# Patient Record
Sex: MALE | Race: WHITE | NOT HISPANIC OR LATINO
[De-identification: names, ages, dates, MRNs, and addresses within clinical notes are randomized per-mention and may not be internally consistent; named-entity substitution may affect disease eponyms.]

---

## 2020-02-15 ENCOUNTER — TRANSCRIPTION ENCOUNTER (OUTPATIENT)
Age: 35
End: 2020-02-15

## 2020-08-25 PROBLEM — Z00.00 ENCOUNTER FOR PREVENTIVE HEALTH EXAMINATION: Status: ACTIVE | Noted: 2020-08-25

## 2020-08-26 ENCOUNTER — APPOINTMENT (OUTPATIENT)
Dept: UROLOGY | Facility: CLINIC | Age: 35
End: 2020-08-26

## 2020-09-08 ENCOUNTER — TRANSCRIPTION ENCOUNTER (OUTPATIENT)
Age: 35
End: 2020-09-08

## 2020-09-09 ENCOUNTER — TRANSCRIPTION ENCOUNTER (OUTPATIENT)
Age: 35
End: 2020-09-09

## 2020-09-24 ENCOUNTER — APPOINTMENT (OUTPATIENT)
Dept: UROLOGY | Facility: CLINIC | Age: 35
End: 2020-09-24
Payer: COMMERCIAL

## 2020-09-24 VITALS
RESPIRATION RATE: 14 BRPM | TEMPERATURE: 98 F | BODY MASS INDEX: 30.06 KG/M2 | WEIGHT: 210 LBS | HEIGHT: 70 IN | DIASTOLIC BLOOD PRESSURE: 84 MMHG | OXYGEN SATURATION: 96 % | SYSTOLIC BLOOD PRESSURE: 142 MMHG | HEART RATE: 62 BPM

## 2020-09-24 DIAGNOSIS — N39.43 POST-VOID DRIBBLING: ICD-10-CM

## 2020-09-24 DIAGNOSIS — Z83.3 FAMILY HISTORY OF DIABETES MELLITUS: ICD-10-CM

## 2020-09-24 DIAGNOSIS — Z78.9 OTHER SPECIFIED HEALTH STATUS: ICD-10-CM

## 2020-09-24 DIAGNOSIS — Z82.49 FAMILY HISTORY OF ISCHEMIC HEART DISEASE AND OTHER DISEASES OF THE CIRCULATORY SYSTEM: ICD-10-CM

## 2020-09-24 DIAGNOSIS — Z86.39 PERSONAL HISTORY OF OTHER ENDOCRINE, NUTRITIONAL AND METABOLIC DISEASE: ICD-10-CM

## 2020-09-24 PROCEDURE — 99203 OFFICE O/P NEW LOW 30 MIN: CPT

## 2020-09-24 NOTE — HISTORY OF PRESENT ILLNESS
[FreeTextEntry1] : He is a 35-year-old man who is seen today for initial visit. In the last month he has had bilateral testicular pain radiating into the left groin. There is no change in urinary symptoms. He does not have any fever. There is no history of trauma. His primary care physician gave him doxycycline but symptoms did not change. A similar episode happened 5 years ago and he responded to antibiotics. Radiological studies showed normal scrotal ultrasound in September 2020 and also in 2014. CT scan without contrast in 2013 was normal. He has left-sided varicoceles. He complains of post void dribbling.

## 2020-09-24 NOTE — PHYSICAL EXAM
[General Appearance - Well Developed] : well developed [General Appearance - Well Nourished] : well nourished [Normal Appearance] : normal appearance [Well Groomed] : well groomed [General Appearance - In No Acute Distress] : no acute distress [Edema] : no peripheral edema [Respiration, Rhythm And Depth] : normal respiratory rhythm and effort [Exaggerated Use Of Accessory Muscles For Inspiration] : no accessory muscle use [Abdomen Soft] : soft [Abdomen Tenderness] : non-tender [Costovertebral Angle Tenderness] : no ~M costovertebral angle tenderness [Urethral Meatus] : meatus normal [Penis Abnormality] : normal uncircumcised penis [Urinary Bladder Findings] : the bladder was normal on palpation [Scrotum] : the scrotum was normal [Testes Mass (___cm)] : there were no testicular masses [FreeTextEntry1] : Left grade 2 varicoceles. Left epididymis tender [Normal Station and Gait] : the gait and station were normal for the patient's age [] : no rash [No Focal Deficits] : no focal deficits [Oriented To Time, Place, And Person] : oriented to person, place, and time [Affect] : the affect was normal [Mood] : the mood was normal [Not Anxious] : not anxious [Inguinal Lymph Nodes Enlarged Bilaterally] : inguinal

## 2020-09-24 NOTE — ASSESSMENT
[FreeTextEntry1] : He has tenderness of the left epididymis. Also varicoceles may be contributing to his pain as well. Suggest scrotal elevation, ice packs, no heavy lifting, and also dietary modifications were discussed. If symptoms do not improve he will start anti-inflammatory agents. Subsequently we can try other medications such as alpha blocker therapy, gabapentin, etc or consider cord injection. He will start with conservative management and anti-inflammatory agents for now. Also we discussed maneuvers which could decrease postvoid dribbling.

## 2020-09-25 LAB
APPEARANCE: CLEAR
BACTERIA: NEGATIVE
BILIRUBIN URINE: NEGATIVE
BLOOD URINE: NEGATIVE
COLOR: YELLOW
GLUCOSE QUALITATIVE U: NEGATIVE
HYALINE CASTS: 0 /LPF
KETONES URINE: NEGATIVE
LEUKOCYTE ESTERASE URINE: NEGATIVE
MICROSCOPIC-UA: NORMAL
NITRITE URINE: NEGATIVE
PH URINE: 7
PROTEIN URINE: ABNORMAL
RED BLOOD CELLS URINE: 1 /HPF
SPECIFIC GRAVITY URINE: 1.03
SQUAMOUS EPITHELIAL CELLS: 0 /HPF
UROBILINOGEN URINE: ABNORMAL
WHITE BLOOD CELLS URINE: 1 /HPF

## 2020-09-27 LAB — BACTERIA UR CULT: NORMAL

## 2021-03-24 ENCOUNTER — TRANSCRIPTION ENCOUNTER (OUTPATIENT)
Age: 36
End: 2021-03-24

## 2021-04-01 ENCOUNTER — TRANSCRIPTION ENCOUNTER (OUTPATIENT)
Age: 36
End: 2021-04-01

## 2021-07-14 ENCOUNTER — APPOINTMENT (OUTPATIENT)
Dept: UROLOGY | Facility: CLINIC | Age: 36
End: 2021-07-14
Payer: COMMERCIAL

## 2021-07-14 VITALS
HEIGHT: 70 IN | DIASTOLIC BLOOD PRESSURE: 90 MMHG | RESPIRATION RATE: 15 BRPM | TEMPERATURE: 97.7 F | WEIGHT: 210 LBS | BODY MASS INDEX: 30.06 KG/M2 | SYSTOLIC BLOOD PRESSURE: 135 MMHG | OXYGEN SATURATION: 96 % | HEART RATE: 65 BPM

## 2021-07-14 DIAGNOSIS — N45.2 ORCHITIS: ICD-10-CM

## 2021-07-14 DIAGNOSIS — I86.1 SCROTAL VARICES: ICD-10-CM

## 2021-07-14 PROCEDURE — 99213 OFFICE O/P EST LOW 20 MIN: CPT

## 2021-07-14 PROCEDURE — 99072 ADDL SUPL MATRL&STAF TM PHE: CPT

## 2021-07-14 NOTE — ASSESSMENT
[FreeTextEntry1] : Urinalysis and urine culture will be sent.  He has no GI related symptoms and he has been evaluated by gastroenterology in the past, according to the patient.  He will try to stay more hydrated which seems to help with his symptoms.  He would rather try another course of antibiotic therapy rather than other treatment such as alpha-blocker therapy or anti-inflammatory agents.  He can try Bactrim for 5 days to see if symptoms improve.  Lifestyle and dietary modifications were discussed.

## 2021-07-14 NOTE — PHYSICAL EXAM
[General Appearance - Well Developed] : well developed [General Appearance - Well Nourished] : well nourished [Normal Appearance] : normal appearance [Well Groomed] : well groomed [General Appearance - In No Acute Distress] : no acute distress [Abdomen Soft] : soft [Abdomen Tenderness] : non-tender [Costovertebral Angle Tenderness] : no ~M costovertebral angle tenderness [Urethral Meatus] : meatus normal [Penis Abnormality] : normal uncircumcised penis [Urinary Bladder Findings] : the bladder was normal on palpation [Scrotum] : the scrotum was normal [Testes Tenderness] : no tenderness of the testes [Testes Mass (___cm)] : there were no testicular masses [FreeTextEntry1] : Left grade 2 varicoceles unchanged, no testicular pain. [] : no respiratory distress [Respiration, Rhythm And Depth] : normal respiratory rhythm and effort [Exaggerated Use Of Accessory Muscles For Inspiration] : no accessory muscle use [Oriented To Time, Place, And Person] : oriented to person, place, and time [Affect] : the affect was normal [Mood] : the mood was normal [Not Anxious] : not anxious

## 2021-07-14 NOTE — REVIEW OF SYSTEMS
[Abdominal Pain] : abdominal pain [see HPI] : see HPI [Nocturia] : nocturia [Negative] : Respiratory

## 2021-07-14 NOTE — HISTORY OF PRESENT ILLNESS
[FreeTextEntry1] : He is a 36 year-old man who is seen today in follow-up.  He was last seen in September 2020.  In the last few weeks he has burning at the base of the penis, discomfort in the left groin and also has urinary frequency and urgency with nocturia up to 5 times.  He only urinates small amounts at nights.  Residual urine today was minimal.  There is no hematuria or flank pain.  There is no fever.  In September 2020, urinalysis and urine culture were negative.  For similar episodes in the past, he has responded to antibiotic therapy.  There is no change in his bowel symptoms.\par \par Previous notes: Previously, he had bilateral testicular pain radiating into the left groin. His primary care physician gave him doxycycline but symptoms did not change. A similar episode happened 5 years ago and he responded to antibiotics. Radiological studies showed normal scrotal ultrasound in September 2020 and also in 2014. CT scan without contrast in 2013 was normal. He has left-sided varicoceles. He complains of post void dribbling.

## 2021-07-15 LAB
APPEARANCE: CLEAR
BACTERIA: NEGATIVE
BILIRUBIN URINE: NEGATIVE
BLOOD URINE: NEGATIVE
COLOR: COLORLESS
GLUCOSE QUALITATIVE U: NEGATIVE
HYALINE CASTS: 0 /LPF
KETONES URINE: NEGATIVE
LEUKOCYTE ESTERASE URINE: NEGATIVE
MICROSCOPIC-UA: NORMAL
NITRITE URINE: NEGATIVE
PH URINE: 6.5
PROTEIN URINE: NEGATIVE
RED BLOOD CELLS URINE: 1 /HPF
SPECIFIC GRAVITY URINE: 1.01
SQUAMOUS EPITHELIAL CELLS: 0 /HPF
UROBILINOGEN URINE: NORMAL
WHITE BLOOD CELLS URINE: 0 /HPF

## 2021-07-16 LAB — BACTERIA UR CULT: NORMAL

## 2022-03-22 ENCOUNTER — TRANSCRIPTION ENCOUNTER (OUTPATIENT)
Age: 37
End: 2022-03-22

## 2022-05-12 ENCOUNTER — APPOINTMENT (OUTPATIENT)
Dept: NEUROLOGY | Facility: CLINIC | Age: 37
End: 2022-05-12
Payer: COMMERCIAL

## 2022-05-12 VITALS
SYSTOLIC BLOOD PRESSURE: 138 MMHG | HEIGHT: 70 IN | DIASTOLIC BLOOD PRESSURE: 90 MMHG | WEIGHT: 230 LBS | BODY MASS INDEX: 32.93 KG/M2 | HEART RATE: 75 BPM

## 2022-05-12 DIAGNOSIS — R42 DIZZINESS AND GIDDINESS: ICD-10-CM

## 2022-05-12 DIAGNOSIS — R51.9 HEADACHE, UNSPECIFIED: ICD-10-CM

## 2022-05-12 PROCEDURE — 99203 OFFICE O/P NEW LOW 30 MIN: CPT

## 2022-05-12 RX ORDER — PREDNISONE 20 MG/1
20 TABLET ORAL
Qty: 18 | Refills: 0 | Status: ACTIVE | COMMUNITY
Start: 2022-05-12 | End: 1900-01-01

## 2022-05-12 RX ORDER — DIAZEPAM 5 MG/1
5 TABLET ORAL
Qty: 2 | Refills: 0 | Status: ACTIVE | COMMUNITY
Start: 2022-05-12 | End: 1900-01-01

## 2022-05-12 NOTE — HISTORY OF PRESENT ILLNESS
[FreeTextEntry1] : 36 y/o RH man with hx of Hashimoto's thyroiditis p/w vertigo. He two weeks ago had episode of dizziness and room spinning and went to [facility]. A week later he still had symptoms and went to an ENT and had workup which suggested which suggested positional vertigo without hearing loss. He still did not improve and was seen and found to have abnormal eye movements with a vertigo specialist. \par If sitting doing nothing he will feel nauseated, but if he gets up or exerts himself he feels more especially to the right. He gets a pain on the left side he feels pain in the left ear and sensation of fullness and ringing in the right ear. Has headache on the left side most of the time. He took meclizine which took the edge off but was very sedating. He gets headaches trigger by the weather and has had then since he was younger. Feels like a stabbing type of pain and can last for a couple of hours, feels better if lies down to takes Advil. He has no family members with headache but there is hx of Lupus and Rheumatoid arthritis.  He did not have any head imaging when at the hospital or ENT. \par

## 2022-05-12 NOTE — PHYSICAL EXAM
[General Appearance - Alert] : alert [General Appearance - In No Acute Distress] : in no acute distress [Oriented To Time, Place, And Person] : oriented to person, place, and time [Impaired Insight] : insight and judgment were intact [Affect] : the affect was normal [Person] : oriented to person [Place] : oriented to place [Time] : oriented to time [Concentration Intact] : normal concentrating ability [Visual Intact] : visual attention was ~T not ~L decreased [Naming Objects] : no difficulty naming common objects [Repeating Phrases] : no difficulty repeating a phrase [Writing A Sentence] : no difficulty writing a sentence [Fluency] : fluency intact [Comprehension] : comprehension intact [Reading] : reading intact [Past History] : adequate knowledge of personal past history [Cranial Nerves Optic (II)] : visual acuity intact bilaterally,  visual fields full to confrontation, pupils equal round and reactive to light [Cranial Nerves Oculomotor (III)] : extraocular motion intact [Cranial Nerves Trigeminal (V)] : facial sensation intact symmetrically [Cranial Nerves Facial (VII)] : face symmetrical [Cranial Nerves Vestibulocochlear (VIII)] : hearing was intact bilaterally [Cranial Nerves Glossopharyngeal (IX)] : tongue and palate midline [Cranial Nerves Accessory (XI - Cranial And Spinal)] : head turning and shoulder shrug symmetric [Cranial Nerves Hypoglossal (XII)] : there was no tongue deviation with protrusion [Motor Tone] : muscle tone was normal in all four extremities [Motor Strength] : muscle strength was normal in all four extremities [No Muscle Atrophy] : normal bulk in all four extremities [Motor Handedness Right-Handed] : the patient is right hand dominant [Sensation Tactile Decrease] : light touch was intact [Sensation Pain / Temperature Decrease] : pain and temperature was intact [Sensation Vibration Decrease] : vibration was intact [Abnormal Walk] : normal gait [Balance] : balance was intact [2+] : Ankle jerk left 2+ [Sclera] : the sclera and conjunctiva were normal [PERRL With Normal Accommodation] : pupils were equal in size, round, reactive to light, with normal accommodation [Extraocular Movements] : extraocular movements were intact [Full Visual Field] : full visual field [Optic Disc Not Visualized] : not visualized [Outer Ear] : the ears and nose were normal in appearance [Hearing Threshold Finger Rub Not [place]] : hearing was normal [Both Tympanic Membranes Were Examined] : both tympanic membranes were normal [Neck Appearance] : the appearance of the neck was normal [] : no respiratory distress [Respiration, Rhythm And Depth] : normal respiratory rhythm and effort [Past-pointing] : there was no past-pointing [Tremor] : no tremor present [Plantar Reflex Right Only] : normal on the right [Plantar Reflex Left Only] : normal on the left [FreeTextEntry6] : [place] pick positive to the right. Head impulse positive to the right

## 2022-05-12 NOTE — ASSESSMENT
[FreeTextEntry1] : 36 y/o RH man with hx of Hashimoto's p/w two weeks of vertigo, nausea, and left sided earache and headache. He has hx of almost daily headache since he was younger c/w migraine headaches. 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 Possible vestibular neuritis vs vertiginous migraine vs less likely demyelinating lesion . \par \par Prednisone- 60mg for 3 days, 40mg for 3 days, and 20mg for 2 days. \par Vestibular Therapy referral\par MRI brain w/wo\par Valium 5mg x 2 tablets  for MRI claustrophobia

## 2022-05-18 ENCOUNTER — EMERGENCY (EMERGENCY)
Facility: HOSPITAL | Age: 37
LOS: 1 days | Discharge: LEFT BEFORE DISCHARGE | End: 2022-05-18
Attending: EMERGENCY MEDICINE | Admitting: EMERGENCY MEDICINE
Payer: COMMERCIAL

## 2022-05-18 VITALS
TEMPERATURE: 98 F | HEART RATE: 78 BPM | DIASTOLIC BLOOD PRESSURE: 94 MMHG | SYSTOLIC BLOOD PRESSURE: 145 MMHG | RESPIRATION RATE: 16 BRPM | OXYGEN SATURATION: 99 %

## 2022-05-18 LAB
ALBUMIN SERPL ELPH-MCNC: 4.8 G/DL — SIGNIFICANT CHANGE UP (ref 3.3–5)
ALP SERPL-CCNC: 63 U/L — SIGNIFICANT CHANGE UP (ref 40–120)
ALT FLD-CCNC: 26 U/L — SIGNIFICANT CHANGE UP (ref 4–41)
ANION GAP SERPL CALC-SCNC: 13 MMOL/L — SIGNIFICANT CHANGE UP (ref 7–14)
APPEARANCE UR: CLEAR — SIGNIFICANT CHANGE UP
AST SERPL-CCNC: 20 U/L — SIGNIFICANT CHANGE UP (ref 4–40)
BACTERIA # UR AUTO: NEGATIVE — SIGNIFICANT CHANGE UP
BASOPHILS # BLD AUTO: 0.08 K/UL — SIGNIFICANT CHANGE UP (ref 0–0.2)
BASOPHILS NFR BLD AUTO: 0.7 % — SIGNIFICANT CHANGE UP (ref 0–2)
BILIRUB SERPL-MCNC: 0.3 MG/DL — SIGNIFICANT CHANGE UP (ref 0.2–1.2)
BILIRUB UR-MCNC: NEGATIVE — SIGNIFICANT CHANGE UP
BUN SERPL-MCNC: 22 MG/DL — SIGNIFICANT CHANGE UP (ref 7–23)
CALCIUM SERPL-MCNC: 9.6 MG/DL — SIGNIFICANT CHANGE UP (ref 8.4–10.5)
CHLORIDE SERPL-SCNC: 98 MMOL/L — SIGNIFICANT CHANGE UP (ref 98–107)
CO2 SERPL-SCNC: 26 MMOL/L — SIGNIFICANT CHANGE UP (ref 22–31)
COLOR SPEC: SIGNIFICANT CHANGE UP
CREAT SERPL-MCNC: 1.05 MG/DL — SIGNIFICANT CHANGE UP (ref 0.5–1.3)
DIFF PNL FLD: NEGATIVE — SIGNIFICANT CHANGE UP
EGFR: 94 ML/MIN/1.73M2 — SIGNIFICANT CHANGE UP
EOSINOPHIL # BLD AUTO: 0.08 K/UL — SIGNIFICANT CHANGE UP (ref 0–0.5)
EOSINOPHIL NFR BLD AUTO: 0.7 % — SIGNIFICANT CHANGE UP (ref 0–6)
GLUCOSE SERPL-MCNC: 93 MG/DL — SIGNIFICANT CHANGE UP (ref 70–99)
GLUCOSE UR QL: NEGATIVE — SIGNIFICANT CHANGE UP
HCT VFR BLD CALC: 45.6 % — SIGNIFICANT CHANGE UP (ref 39–50)
HGB BLD-MCNC: 15.1 G/DL — SIGNIFICANT CHANGE UP (ref 13–17)
HIV 1+2 AB+HIV1 P24 AG SERPL QL IA: SIGNIFICANT CHANGE UP
IANC: 7.77 K/UL — HIGH (ref 1.8–7.4)
IMM GRANULOCYTES NFR BLD AUTO: 0.7 % — SIGNIFICANT CHANGE UP (ref 0–1.5)
KETONES UR-MCNC: NEGATIVE — SIGNIFICANT CHANGE UP
LEUKOCYTE ESTERASE UR-ACNC: NEGATIVE — SIGNIFICANT CHANGE UP
LYMPHOCYTES # BLD AUTO: 2.75 K/UL — SIGNIFICANT CHANGE UP (ref 1–3.3)
LYMPHOCYTES # BLD AUTO: 23.1 % — SIGNIFICANT CHANGE UP (ref 13–44)
MCHC RBC-ENTMCNC: 30.6 PG — SIGNIFICANT CHANGE UP (ref 27–34)
MCHC RBC-ENTMCNC: 33.1 GM/DL — SIGNIFICANT CHANGE UP (ref 32–36)
MCV RBC AUTO: 92.3 FL — SIGNIFICANT CHANGE UP (ref 80–100)
MONOCYTES # BLD AUTO: 1.12 K/UL — HIGH (ref 0–0.9)
MONOCYTES NFR BLD AUTO: 9.4 % — SIGNIFICANT CHANGE UP (ref 2–14)
NEUTROPHILS # BLD AUTO: 7.77 K/UL — HIGH (ref 1.8–7.4)
NEUTROPHILS NFR BLD AUTO: 65.4 % — SIGNIFICANT CHANGE UP (ref 43–77)
NITRITE UR-MCNC: NEGATIVE — SIGNIFICANT CHANGE UP
NRBC # BLD: 0 /100 WBCS — SIGNIFICANT CHANGE UP
NRBC # FLD: 0 K/UL — SIGNIFICANT CHANGE UP
PH UR: 6.5 — SIGNIFICANT CHANGE UP (ref 5–8)
PLATELET # BLD AUTO: 303 K/UL — SIGNIFICANT CHANGE UP (ref 150–400)
POTASSIUM SERPL-MCNC: 3.9 MMOL/L — SIGNIFICANT CHANGE UP (ref 3.5–5.3)
POTASSIUM SERPL-SCNC: 3.9 MMOL/L — SIGNIFICANT CHANGE UP (ref 3.5–5.3)
PROT SERPL-MCNC: 7.9 G/DL — SIGNIFICANT CHANGE UP (ref 6–8.3)
PROT UR-MCNC: ABNORMAL
RAPID RVP RESULT: SIGNIFICANT CHANGE UP
RBC # BLD: 4.94 M/UL — SIGNIFICANT CHANGE UP (ref 4.2–5.8)
RBC # FLD: 12.6 % — SIGNIFICANT CHANGE UP (ref 10.3–14.5)
RBC CASTS # UR COMP ASSIST: 1 /HPF — SIGNIFICANT CHANGE UP (ref 0–4)
SARS-COV-2 RNA SPEC QL NAA+PROBE: SIGNIFICANT CHANGE UP
SODIUM SERPL-SCNC: 137 MMOL/L — SIGNIFICANT CHANGE UP (ref 135–145)
SP GR SPEC: 1.02 — SIGNIFICANT CHANGE UP (ref 1–1.05)
TSH SERPL-MCNC: 4.91 UIU/ML — HIGH (ref 0.27–4.2)
UROBILINOGEN FLD QL: SIGNIFICANT CHANGE UP
WBC # BLD: 11.88 K/UL — HIGH (ref 3.8–10.5)
WBC # FLD AUTO: 11.88 K/UL — HIGH (ref 3.8–10.5)
WBC UR QL: 1 /HPF — SIGNIFICANT CHANGE UP (ref 0–5)

## 2022-05-18 PROCEDURE — 99243 OFF/OP CNSLTJ NEW/EST LOW 30: CPT

## 2022-05-18 PROCEDURE — 99220: CPT

## 2022-05-18 PROCEDURE — 70450 CT HEAD/BRAIN W/O DYE: CPT | Mod: 26,MA

## 2022-05-18 RX ORDER — ACETAMINOPHEN 500 MG
975 TABLET ORAL ONCE
Refills: 0 | Status: DISCONTINUED | OUTPATIENT
Start: 2022-05-18 | End: 2022-05-22

## 2022-05-18 RX ORDER — DIAZEPAM 5 MG
10 TABLET ORAL ONCE
Refills: 0 | Status: DISCONTINUED | OUTPATIENT
Start: 2022-05-18 | End: 2022-05-19

## 2022-05-18 RX ORDER — DIAZEPAM 5 MG
10 TABLET ORAL ONCE
Refills: 0 | Status: DISCONTINUED | OUTPATIENT
Start: 2022-05-18 | End: 2022-05-18

## 2022-05-18 RX ORDER — LEVOTHYROXINE SODIUM 125 MCG
100 TABLET ORAL DAILY
Refills: 0 | Status: DISCONTINUED | OUTPATIENT
Start: 2022-05-18 | End: 2022-05-22

## 2022-05-18 RX ADMIN — Medication 10 MILLIGRAM(S): at 21:28

## 2022-05-18 NOTE — ED ADULT TRIAGE NOTE - CHIEF COMPLAINT QUOTE
states" I am having dizziness with head ache since 3 weeks and took meclizine and prednisone with no relief" sent in by neurologist . c/o head ache

## 2022-05-18 NOTE — ED ADULT NURSE NOTE - OBJECTIVE STATEMENT
torri rn- received pt in room 11, 37 yr/o male A+ox4, ambulatory at baseline. presented to the ED C/O worsening headache and vertigo over the last 3 weeks, sates he was seen by his PCP and prescribed meclizine and prednisone with no relief of pain. states pain radiates to the left ear, symptoms worsen with movement and laying flat in bed. pt endorses new onset dizziness and feelings of being off balance. VSS, denies chest pain and SOB, RR even and unlabored. pending MD orders. will continue to monitor.

## 2022-05-18 NOTE — ED CDU PROVIDER INITIAL DAY NOTE - OBJECTIVE STATEMENT
Ralph - 38 y/o M c/o nausea and dizziness constantly present x approx 3 weeks.  Dizziness described as spinning sensation.  Worse with movement of head or lying on left side.  Denies blurry vision, extremity weakness or numbness.  + headache.  To Neurologist Dr. Hatfield last thursday, prescribed prednisone and meclizine.  Started prednisone on Saturday.  No improvement in symptoms.  spoke to Neurologist today and referred to ED.  Neuro team already at bedside.  No known head trauma.  No recent fever, cough, covid.    CDU Note: Agree with above, pt is a 38 y/o M hx Hashimotos thyroiditis here w/ vertigo x 3 weeks, sent in by outpatient neuro for MRI head to eval for central causes of vertigo. Pt currently c/o headache, but does not want any pain meds. Able to ambulate. NO other complaints.

## 2022-05-18 NOTE — ED PROVIDER NOTE - OBJECTIVE STATEMENT
Loree - 38 y/o M c/o nausea and dizziness constantly present x approx 3 weeks.  Dizziness described as spinning sensation.  Worse with movement of head or lying on left side.  Denies blurry vision, extremity weakness or numbness.  + headache.  To Neurologist Dr. Hatfield last thursday, prescribed prednisone and meclizine.  Started prednisone on Saturday.  No improvement in symptoms.  spoke to Neurologist today and referred to ED.  Neuro team already at bedside.  No known head trauma.  No recent fever, cough, covid.

## 2022-05-18 NOTE — ED CDU PROVIDER INITIAL DAY NOTE - NSICDXFAMILYHX_GEN_ALL_CORE_FT
FAMILY HISTORY:  Mother  Still living? Unknown  Family history of autoimmune disorder, Age at diagnosis: Age Unknown    
detailed exam

## 2022-05-18 NOTE — CONSULT NOTE ADULT - ASSESSMENT
36yo R-handed man with PMH of Hashimoto's thyroiditis presents to the ED with worsening vertigo since 04/30/22. Additionally had vertical diplopia which resolved but complains of nausea, L-sided headache and earache, and R-sided tinnitus worse after prednisone taper. Physical exam remarkable for HIT+ to the R and hyperreflexia of the RLE with pronator drift in the RUE. Labs and imaging are pending.    Impression: Worsening vertigo with daily L-sided headaches and earaches with R-sided tinnitus possibly due to vestibular neuritis in the setting of recent sinus infection vs. vertiginous migraine in the setting of daily headaches vs. r/o demyelinating lesion.    Recommendations:  [] CDU for MRI brain w/w/o contrast with thin cuts through IAC  [] Valium 5mg x 2 tabs for MRI claustrophobia  [] CBC, BMP, TSH, HIV  [] UA, UCx  [] Vestibular therapy outpatient  [] Orthostatic vital signs    Case to be seen and discussed with neurology attending Dr. Palma. 38yo R-handed man with PMH of Hashimoto's thyroiditis presents to the ED with worsening vertigo since 04/30/22. Additionally had vertical diplopia which resolved but complains of nausea, L-sided headache and earache, and R-sided tinnitus worse after prednisone taper. Physical exam remarkable for HIT+ and torsional nystagmus with slow phase to the R, slight pronator drift in the RUE and hyperreflexia of the RLE. Labs and imaging are pending.    Impression: Worsening vertigo with daily L-sided headaches and earaches with R-sided tinnitus possibly due to vestibular neuritis in the setting of recent sinus infection vs. vertiginous migraine in the setting of daily headaches vs. r/o demyelinating lesion.    Recommendations:  [] CDU for MRI brain w/w/o contrast with thin cuts through IAC  [] Valium 5mg x 2 tabs for MRI claustrophobia  [] CBC, BMP, TSH, HIV  [] UA, UCx  [] Vestibular therapy outpatient  [] Orthostatic vital signs    Case to be seen and discussed with neurology attending Dr. Palma.

## 2022-05-18 NOTE — ED CDU PROVIDER INITIAL DAY NOTE - PHYSICAL EXAMINATION
ATTENDING PHYSICAL EXAM  GEN - NAD; well appearing; A+O x3  HEAD - NC/AT; EYES/NOSE - PERRL, EOMI, horiz nystagmus noted, mucous membranes moist, no discharge; THROAT: Oral cavity and pharynx normal. No inflammation, swelling, exudate, or lesions  NECK: Neck supple, non-tender without lymphadenopathy, no masses, no JVD  PULMONARY - CTA b/l, symmetric breath sounds, no w/r/r  CARDIAC -s1s2, RRR, no M,R,G  ABDOMEN - +NABS, ND, NT, soft, no guarding, no rebound, no masses   BACK - no CVA tenderness, No vertebral or paravertebral tenderness  EXTREMITIES - symmetric pulses, 2+ dp, capillary refill < 2 seconds, no clubbing, no cyanosis, no edema  SKIN - no rash or bruising   NEUROLOGIC - alert, CN 2-12 intact, reflexes nl, sensation nl, coordination nl, finger to nose nl, romberg negative, motor 5/5 RUE/LUE/RLE/LLE/EHL/Plantar flexion, no pronator drift, gait nl

## 2022-05-18 NOTE — ED CDU PROVIDER INITIAL DAY NOTE - DETAILS
Vertigo, not responsive to Meclizine and steroids, plan neuro eval/checks, MRI brain to r/o intracranial pathology

## 2022-05-18 NOTE — ED ADULT NURSE NOTE - NSIMPLEMENTINTERV_GEN_ALL_ED
Implemented All Fall Risk Interventions:  [place] to call system. Call bell, personal items and telephone within reach. Instruct patient to call for assistance. Room bathroom lighting operational. Non-slip footwear when patient is off stretcher. Physically safe environment: no spills, clutter or unnecessary equipment. Stretcher in lowest position, wheels locked, appropriate side rails in place. Provide visual cue, wrist band, yellow gown, etc. Monitor gait and stability. Monitor for mental status changes and reorient to person, place, and time. Review medications for side effects contributing to fall risk. Reinforce activity limits and safety measures with patient and family.

## 2022-05-18 NOTE — CONSULT NOTE ADULT - SUBJECTIVE AND OBJECTIVE BOX
Neurology  Consult Note  05-18-22    Name:  GRIFFIN GOLDSTEIN; 37y (1985)    HPI: 38yo R-handed man with PMH of Hashimoto's thyroiditis presents to the ED with worsening vertigo since 04/30/22. Patient was seen for similar symptoms at at [facility], had follow-up with ENT with workup suggestive of positional vertigo without hearing loss. Patient describes dizziness as lightheadedness which is worse with changes in position. Initially for about 4 days he noticed vertical diplopia, particularly in the morning, which resolved spontaneously. Additionally he has been experiencing nausea, no vomiting, which is worse when laying down. He also complains of intermittent L ear pain, described as fullness, and tinnitus in the R ear. He also has intermittent "achy" L-sided headaches worse at the top of the head and behind the ear. He reports history of headaches since he was a teenager. He was previously told they were ice-pick headaches" and was to have workup with MRI which he did not complete.     For current symptoms he has tried meclizine, had no significant symptomatic relief, and reported the medication made him tired. He was seen in the neurology office by Dr. Tam and began a steroid taper 4 days ago, however the steroids worsened the symptoms and he was sent to the ED for further workup. Patient reports strong family history for autoimmune disorders.    Review of Systems:  Reports increased urinary frequency, but denies dysuria. Reports frequent sinus infections, most recently a week ago for which he took antibiotics. He denies weakness, numbness, difficulty swallowing, cough, SOB, recent fever, illness, or fall.    Allergies:  peanuts (Unknown)  penicillin (Unknown)  shellfish (Unknown)  Tree Nuts (Unknown)      PMHx:   Hashimoto thyroiditis    PFHx:   Lupus  RA  DM  Sarcoidosis      Vitals:  T(C): 36.4 (05-18-22 @ 13:38), Max: 36.4 (05-18-22 @ 13:38)  HR: 78 (05-18-22 @ 13:38) (78 - 78)  BP: 145/94 (05-18-22 @ 13:38) (145/94 - 145/94)  RR: 16 (05-18-22 @ 13:38) (16 - 16)  SpO2: 99% (05-18-22 @ 13:38) (99% - 99%)    Physical Examination:  Neurologic:  - Mental Status: Alert, awake, oriented to person, place, and time; Speech is fluent with intact comprehension; Good overall fund of knowledge; Follows all commands. Normal mood and affect.  - Cranial Nerves:  II: Visual fields are full to confrontation; Pupils are equal, round, and reactive to light;  III, IV, VI: Extraocular movements are intact without nystagmus. HIT+ to the right  V: Facial sensation is intact in the V1-V3 distribution bilaterally.  VII: Face is symmetric with normal eye closure and smile.  VIII: Hearing is intact to finger rub.  IX, X: Uvula is midline and soft palate rises symmetrically.  XI: Head turning and shoulder shrug are intact.  XII: Tongue protrudes in the midline.  - Motor: Strength is 5/5 throughout. There is R pronator drift.  - Reflexes: Trace and symmetric at the biceps, brachioradialis, and ankles. Knees are 2+ on the R and trace on the L. Plantar responses mute bilaterally.  - Sensory: Intact throughout to light touch. No extinction on double stimulation.  - Coordination: Finger-nose-finger intact without dysmetria. Rapid alternating hand movements intact.  - Gait: Normal steps, base, arm swing, and turning. Tandem gait is cautious and slightly wobbly but grossly normal. Romberg testing is negative. Fukuda's test with initial rotation towards the R, however on reassessment was unremarkable.    Labs:  Pending        Radiology:  Pending Neurology  Consult Note  05-18-22    Name:  GRIFFIN GOLDSTEIN; 37y (1985)    HPI: 36yo R-handed man with PMH of Hashimoto's thyroiditis presents to the ED with worsening vertigo since 04/30/22. Patient was seen for similar symptoms at at [facility], had follow-up with ENT with workup suggestive of positional vertigo without hearing loss. Patient describes dizziness as lightheadedness which is worse with changes in position. Initially for about 4 days he noticed vertical diplopia, particularly in the morning, which resolved spontaneously. Additionally he has been experiencing nausea, no vomiting, which is worse when laying down. He also complains of intermittent L ear pain, described as fullness, and tinnitus in the R ear. He also has intermittent "achy" L-sided headaches worse at the top of the head and behind the ear. He reports history of headaches since he was a teenager. He was previously told they were ice-pick headaches" and was to have workup with MRI which he did not complete.     For current symptoms he has tried meclizine, had no significant symptomatic relief, and reported the medication made him tired. He was seen in the neurology office by Dr. Tam and began a steroid taper 4 days ago, however the steroids worsened the symptoms and he was sent to the ED for further workup. Patient reports strong family history for autoimmune disorders.    Review of Systems:  Reports increased urinary frequency, but denies dysuria. Reports frequent sinus infections, most recently a week ago for which he took antibiotics. He denies weakness, numbness, difficulty swallowing, cough, SOB, recent fever, illness, or fall.    Allergies:  peanuts (Unknown)  penicillin (Unknown)  shellfish (Unknown)  Tree Nuts (Unknown)      PMHx:   Hashimoto thyroiditis    PFHx:   Lupus  RA  DM  Sarcoidosis      Vitals:  T(C): 36.4 (05-18-22 @ 13:38), Max: 36.4 (05-18-22 @ 13:38)  HR: 78 (05-18-22 @ 13:38) (78 - 78)  BP: 145/94 (05-18-22 @ 13:38) (145/94 - 145/94)  RR: 16 (05-18-22 @ 13:38) (16 - 16)  SpO2: 99% (05-18-22 @ 13:38) (99% - 99%)    Physical Examination:  Neurologic:  - Mental Status: Alert, awake, oriented to person, place, and time; Speech is fluent with intact comprehension; Good overall fund of knowledge; Follows all commands. Normal mood and affect.  - Cranial Nerves:  II: Visual fields are full to confrontation; Pupils are equal, round, and reactive to light;  III, IV, VI: Extraocular movements are intact with torsional nystagmus looking to the L with slow phase to the R. HIT+ with slow phase to the R  V: Facial sensation is intact in the V1-V3 distribution bilaterally.  VII: Face is symmetric with normal eye closure and smile.  VIII: Hearing is intact to finger rub.  IX, X: Uvula is midline and soft palate rises symmetrically.  XI: Head turning and shoulder shrug are intact.  XII: Tongue protrudes in the midline.  - Motor: Strength is 5/5 throughout. There is a subtle R pronator drift.  - Reflexes: Trace and symmetric at the biceps, brachioradialis. Knees are 2+ on the R and trace on the L. Ankles are 2+ on the R and trace on the L. Plantar responses mute bilaterally.  - Sensory: Intact throughout to light touch. No extinction on double stimulation.  - Coordination: Finger-nose-finger intact without dysmetria. Rapid alternating hand movements intact.  - Gait: Normal steps, base, arm swing, and turning. Tandem gait is cautious and slightly wobbly but grossly normal. Romberg testing is negative. Fukuda's test with initial rotation towards the R, however on reassessment was unremarkable.    Labs:  Pending        Radiology:  Pending

## 2022-05-18 NOTE — ED PROVIDER NOTE - CLINICAL SUMMARY MEDICAL DECISION MAKING FREE TEXT BOX
38 y/o M c/o nausea and dizziness constantly present x approx 3 weeks, +horizontal nystagmus, hx concerning for peripheral vertigo, however no improvement w/ Meclizine/prednisone, plan labs, cdu for mri. WIll do ct head to r/o acute pathology

## 2022-05-18 NOTE — ED PROVIDER NOTE - PHYSICAL EXAMINATION
ATTENDING PHYSICAL EXAM  GEN - NAD; well appearing; A+O x3  HEAD - NC/AT; EYES/NOSE - PERRL, EOMI, noted horiz nystagmus OU with b/l gaze that fatigues, mucous membranes moist, no discharge; THROAT: Oral cavity and pharynx normal. No inflammation, swelling, exudate, or lesions  NECK: Neck supple, non-tender without lymphadenopathy, no masses, no JVD  PULMONARY - CTA b/l, symmetric breath sounds, no w/r/r  CARDIAC -s1s2, RRR, no M,R,G  ABDOMEN - +NABS, ND, NT, soft, no guarding, no rebound, no masses   BACK - no CVA tenderness, No vertebral or paravertebral tenderness  EXTREMITIES - symmetric pulses, 2+ dp, capillary refill < 2 seconds, no clubbing, no cyanosis, no edema  SKIN - no rash or bruising   NEUROLOGIC - alert, CN 2-12 intact, reflexes nl, sensation nl, coordination nl, finger to nose nl, romberg negative, motor 5/5 RUE/LUE/RLE/LLE/EHL/Plantar flexion, no pronator drift, gait nl

## 2022-05-18 NOTE — ED PROVIDER NOTE - NS ED ATTENDING STATEMENT MOD
This was a shared visit with the NORMA. I reviewed and verified the documentation and independently performed the documented:

## 2022-05-18 NOTE — CONSULT NOTE ADULT - ATTENDING COMMENTS
HPI as outlined above    Exam EOMI with left beating torsion nystagmus on left gaze. Speech fluent. No dysmetria. Strength 5/5. Walks with narrow base and intact tandem. Does not turn on fukuda    Imp: positional vertigo x 2.5weeks with tinnitus and headache that worsened with recent steroid taper. Probably peripheral vestibulopathy r/o brainstem lesion    Plan as outlined by resident

## 2022-05-18 NOTE — ED ADULT NURSE REASSESSMENT NOTE - NS ED NURSE REASSESS COMMENT FT1
20g Iv placed in LAC, labs drawn and sent. covid sent. neuro at bedside for eval. pt in NAD, respirations even and unlabored. awaiting transport to MRI. will continue to monitor.

## 2022-05-19 VITALS
HEART RATE: 72 BPM | RESPIRATION RATE: 20 BRPM | TEMPERATURE: 98 F | SYSTOLIC BLOOD PRESSURE: 133 MMHG | OXYGEN SATURATION: 97 % | DIASTOLIC BLOOD PRESSURE: 94 MMHG

## 2022-05-19 LAB
B PERT DNA SPEC QL NAA+PROBE: SIGNIFICANT CHANGE UP
B PERT+PARAPERT DNA PNL SPEC NAA+PROBE: SIGNIFICANT CHANGE UP
BORDETELLA PARAPERTUSSIS (RAPRVP): SIGNIFICANT CHANGE UP
C PNEUM DNA SPEC QL NAA+PROBE: SIGNIFICANT CHANGE UP
FLUAV SUBTYP SPEC NAA+PROBE: SIGNIFICANT CHANGE UP
FLUBV RNA SPEC QL NAA+PROBE: SIGNIFICANT CHANGE UP
HADV DNA SPEC QL NAA+PROBE: SIGNIFICANT CHANGE UP
HCOV 229E RNA SPEC QL NAA+PROBE: SIGNIFICANT CHANGE UP
HCOV HKU1 RNA SPEC QL NAA+PROBE: SIGNIFICANT CHANGE UP
HCOV NL63 RNA SPEC QL NAA+PROBE: SIGNIFICANT CHANGE UP
HCOV OC43 RNA SPEC QL NAA+PROBE: SIGNIFICANT CHANGE UP
HMPV RNA SPEC QL NAA+PROBE: SIGNIFICANT CHANGE UP
HPIV1 RNA SPEC QL NAA+PROBE: SIGNIFICANT CHANGE UP
HPIV2 RNA SPEC QL NAA+PROBE: SIGNIFICANT CHANGE UP
HPIV3 RNA SPEC QL NAA+PROBE: SIGNIFICANT CHANGE UP
HPIV4 RNA SPEC QL NAA+PROBE: SIGNIFICANT CHANGE UP
M PNEUMO DNA SPEC QL NAA+PROBE: SIGNIFICANT CHANGE UP
RSV RNA SPEC QL NAA+PROBE: SIGNIFICANT CHANGE UP
RV+EV RNA SPEC QL NAA+PROBE: SIGNIFICANT CHANGE UP

## 2022-05-19 PROCEDURE — 99217: CPT

## 2022-05-19 NOTE — ED CDU PROVIDER SUBSEQUENT DAY NOTE - MEDICAL DECISION MAKING DETAILS
Pt refusing MRI; wants to leave (see HPI).  Will discharge (per Neuro resident, states does not need to AMA) per instructions.

## 2022-05-19 NOTE — ED CDU PROVIDER SUBSEQUENT DAY NOTE - HISTORY
36 yo male, PMH Hashimotos thyroiditis, presented to the ED c/o nausea and constant "spinning" dizziness x 3 weeks that worsens with movement of head or lying on left side.  No vision c/o; + associated headache.  Pt saw an outpatient Neurologist Dr. Tam last week, was prescribed prednisone and meclizine; pt states no improvement in symptoms.  Pt contacted his neurologist on 5/18 and was referred to the ED.   Neurology evaluated pt in the ED, CT head was negative for acute pathology; neurology advised MRI head/IACs.  Pt had complained of headache and dizziness, c/w symptoms he has been having, but repeatedly did not want any pain medications or meclizine.  Pt. had c/o anxiety 5/18 evening, so was given Valium with +relief.  CDU course in interim afterwards was otherwise uneventful.  MRI called for pt in the 0100 AM hour on 5/19; pt went for MRI but then refused to do the test, citing severe claustrophobia and anxiety.  Upon return to CDU, options to reattempt MRI in the morning were discussed; pt was initially amenable, but then changed his mind, stating he would rather be "put out" for MRI and said he will get this done on an outpatient basis and will have this coordinated with his neurologist; pt requesting to leave ED now.  The covering Neuro resident was notified; ED attending Dr. Torres was notified as well.  Pt. will be having a family member come pick him up; pt was advised that due to meds given, he cannot drive; pt verbalizes understanding.

## 2022-05-19 NOTE — ED CDU PROVIDER DISPOSITION NOTE - PATIENT PORTAL LINK FT
You can access the FollowMyHealth Patient Portal offered by [facility] by registering at the following website: [email or web address]. By joining [facility]’s FollowMyHealth portal, you will also be able to view your health information using other applications (apps) compatible with our system.

## 2022-05-19 NOTE — ED CDU PROVIDER DISPOSITION NOTE - NSFOLLOWUPINSTRUCTIONS_ED_ALL_ED_FT
Follow up with your neurologist in 1 - 2 days.  MRI head and IACs was advised; please follow up with your doctor to have this scheduled as soon as possible on outpatient basis.  Notify your primary medical doctor of your ER visit; follow up this coming week.    If you need to find a doctor to follow up with, you may call the [facility] Patient Access Services helpline at [phone] to find names/contact #s for a practitioner (or specialist) to follow up with.    Bring your discharge papers / test results with you to your follow up appointment(s).    Rest / no strenuous activity.  Avoid quick sudden movements.  Stay well hydrated.  Take meclizine as needed for dizziness.    ***Return to the Emergency Department if you experience any new / worsening symptoms or have any problems / concerns.***

## 2022-05-19 NOTE — ED CDU PROVIDER SUBSEQUENT DAY NOTE - NSICDXFAMILYHX_GEN_ALL_CORE_FT
FAMILY HISTORY:  Mother  Still living? Unknown  Family history of autoimmune disorder, Age at diagnosis: Age Unknown

## 2022-05-19 NOTE — ED CDU PROVIDER SUBSEQUENT DAY NOTE - PHYSICAL EXAMINATION
CONSTITUTIONAL:  Well appearing, awake, alert, oriented to person, place, time/situation and in no apparent distress.  Pt. is objectively comfortable appearing and verbalizing in full, clear, effortless sentences.  ENMT: NC/AT.  Airway patent.  Nasal mucosa clear.  Moist mucous membranes.  Neck supple.  EYES:  Clear OU.  CARDIAC:  Normal rate, regular rhythm.  Heart sounds S1 S2.  No murmurs, gallops, or rubs.  RESPIRATORY:  Breath sounds clear and equal bilaterally.  No wheezes, no rales, no rhonchi.  GASTROINTESTINAL:  Abdomen soft, non-distended, non-tender.  No rebound, no guarding.  NEUROLOGICAL:  Alert and oriented to person/place/time/situation.  No focal deficits; no tremors noted.   MUSCULOSKELETAL:  Range of motion is not limited.  Gait stable.    SKIN:  Skin color unremarkable.  Skin warm, dry, and intact.    PSYCHIATRIC:  Alert and oriented to person/place/time/situation.  Mood and affect WNL.  No apparent risk to self or others.

## 2022-05-19 NOTE — ED CDU PROVIDER DISPOSITION NOTE - CLINICAL COURSE
38 yo male, PMH Hashimotos thyroiditis, presented to the ED c/o nausea and constant "spinning" dizziness x 3 weeks that worsens with movement of head or lying on left side.  No vision c/o; + associated headache.  Pt saw an outpatient Neurologist Dr. Tam last week, was prescribed prednisone and meclizine; pt states no improvement in symptoms.  Pt contacted his neurologist on 5/18 and was referred to the ED.   Neurology evaluated pt in the ED, CT head was negative for acute pathology; neurology advised MRI head/IACs.  Pt had complained of headache and dizziness, c/w symptoms he has been having, but repeatedly did not want any pain medications or meclizine.  Pt. had c/o anxiety 5/18 evening, so was given Valium with +relief.  CDU course in interim afterwards was otherwise uneventful.  MRI called for pt in the 0100 AM hour on 5/19; pt went for MRI but then refused to do the test, citing severe claustrophobia and anxiety.  Upon return to CDU, options to reattempt MRI in the morning were discussed; pt was initially amenable, but then changed his mind, stating he would rather be "put out" for MRI and said he will get this done on an outpatient basis and will have this coordinated with his neurologist; pt requesting to leave ED now.  The covering Neuro resident was notified; ED attending Dr. Torres was notified as well.  Pt. will be having a family member come pick him up; pt was advised that due to meds given, he cannot drive; pt verbalizes understanding.

## 2022-05-19 NOTE — ED CDU PROVIDER SUBSEQUENT DAY NOTE - ATTENDING APP SHARED VISIT CONTRIBUTION OF CARE
HPI: 38 yo male, PMH Hashimotos thyroiditis, presented to the ED c/o nausea and constant "spinning" dizziness x 3 weeks that worsens with movement of head or lying on left side.  No vision c/o; + associated headache.  Pt saw an outpatient Neurologist Dr. Tam last week, was prescribed prednisone and meclizine; pt states no improvement in symptoms.  Pt contacted his neurologist on 5/18 and was referred to the ED.   Neurology evaluated pt in the ED, CT head was negative for acute pathology; neurology advised MRI head/IACs.  Pt had complained of headache and dizziness, c/w symptoms he has been having, but repeatedly did not want any pain medications or meclizine.  Pt. had c/o anxiety 5/18 evening, so was given Valium with +relief.  CDU course in interim afterwards was otherwise uneventful.  MRI called for pt in the 0100 AM hour on 5/19; pt went for MRI but then refused to do the test, citing severe claustrophobia and anxiety.  Upon return to CDU, options to reattempt MRI in the morning were discussed; pt was initially amenable, but then changed his mind, stating he would rather be "put out" for MRI and said he will get this done on an outpatient basis and will have this coordinated with his neurologist; pt requesting to leave ED now.  The covering Neuro resident was notified; ED attending Dr. Torres was notified as well.  Pt. will be having a family member come pick him up; pt was advised that due to meds given, he cannot drive; pt verbalizes understanding.  EXAM: NAD  MDM: Pt refusing MRI wants to leave against medical advice. Per neuro they are ok with pt being discharged without MRI and do not want pt AMA'd.

## 2022-05-19 NOTE — CHART NOTE - NSCHARTNOTEFT_GEN_A_CORE
Neurology resident overnight notified of patient wanting to leave and refusing to undergo MRI 2/2 anxiety and fear of claustrophobia. Patient was advised that he would be mildly sedated w/ Valium, however still refusing. Patient is able to ambulate without assistance, still endorsing headache and dizziness. Patient expressed explicitly he would like to leave and undergo MRI at [facility].     Plan:  -Okay for discharge, from a neurology standpoint  -Advise to undergo MRI brain soon after discharge  -Follow-up as outpatient w/ patient's own neurologist: Dr. Ariadna Tam  -No need for AMA discharge, from a neurological standpoint    Discussed w/ on-call neuro attending, Dr. Jose D Palma

## 2022-05-20 LAB
CULTURE RESULTS: SIGNIFICANT CHANGE UP
SPECIMEN SOURCE: SIGNIFICANT CHANGE UP

## 2022-05-23 ENCOUNTER — OUTPATIENT (OUTPATIENT)
Dept: OUTPATIENT SERVICES | Facility: HOSPITAL | Age: 37
LOS: 1 days | End: 2022-05-23

## 2022-05-23 ENCOUNTER — APPOINTMENT (OUTPATIENT)
Dept: MRI IMAGING | Facility: CLINIC | Age: 37
End: 2022-05-23

## 2022-05-23 DIAGNOSIS — R51.9 HEADACHE, UNSPECIFIED: ICD-10-CM

## 2022-05-23 PROBLEM — Z86.39 PERSONAL HISTORY OF OTHER ENDOCRINE, NUTRITIONAL AND METABOLIC DISEASE: Chronic | Status: ACTIVE | Noted: 2022-05-18

## 2022-06-16 ENCOUNTER — NON-APPOINTMENT (OUTPATIENT)
Age: 37
End: 2022-06-16

## 2022-06-22 ENCOUNTER — APPOINTMENT (OUTPATIENT)
Dept: MRI IMAGING | Facility: CLINIC | Age: 37
End: 2022-06-22
Payer: COMMERCIAL

## 2022-06-22 ENCOUNTER — OUTPATIENT (OUTPATIENT)
Dept: OUTPATIENT SERVICES | Facility: HOSPITAL | Age: 37
LOS: 1 days | End: 2022-06-22
Payer: COMMERCIAL

## 2022-06-22 DIAGNOSIS — R42 DIZZINESS AND GIDDINESS: ICD-10-CM

## 2022-06-22 DIAGNOSIS — Z00.8 ENCOUNTER FOR OTHER GENERAL EXAMINATION: ICD-10-CM

## 2022-06-22 PROCEDURE — 70553 MRI BRAIN STEM W/O & W/DYE: CPT | Mod: 26

## 2022-06-22 PROCEDURE — A9585: CPT

## 2022-06-22 PROCEDURE — 70553 MRI BRAIN STEM W/O & W/DYE: CPT

## 2022-06-28 ENCOUNTER — NON-APPOINTMENT (OUTPATIENT)
Age: 37
End: 2022-06-28

## 2022-08-24 ENCOUNTER — NON-APPOINTMENT (OUTPATIENT)
Age: 37
End: 2022-08-24

## 2022-08-25 ENCOUNTER — APPOINTMENT (OUTPATIENT)
Dept: NEUROLOGY | Facility: CLINIC | Age: 37
End: 2022-08-25

## 2022-08-27 ENCOUNTER — NON-APPOINTMENT (OUTPATIENT)
Age: 37
End: 2022-08-27

## 2022-12-12 ENCOUNTER — NON-APPOINTMENT (OUTPATIENT)
Age: 37
End: 2022-12-12

## 2022-12-13 ENCOUNTER — NON-APPOINTMENT (OUTPATIENT)
Age: 37
End: 2022-12-13

## 2022-12-14 ENCOUNTER — APPOINTMENT (OUTPATIENT)
Dept: UROLOGY | Facility: CLINIC | Age: 37
End: 2022-12-14

## 2022-12-14 VITALS
BODY MASS INDEX: 32.93 KG/M2 | SYSTOLIC BLOOD PRESSURE: 149 MMHG | RESPIRATION RATE: 14 BRPM | TEMPERATURE: 97.3 F | OXYGEN SATURATION: 98 % | HEART RATE: 46 BPM | WEIGHT: 230 LBS | DIASTOLIC BLOOD PRESSURE: 98 MMHG | HEIGHT: 70 IN

## 2022-12-14 DIAGNOSIS — R35.0 FREQUENCY OF MICTURITION: ICD-10-CM

## 2022-12-14 DIAGNOSIS — N50.811 RIGHT TESTICULAR PAIN: ICD-10-CM

## 2022-12-14 LAB
APPEARANCE: CLEAR
BACTERIA UR CULT: NORMAL
BACTERIA: NEGATIVE
BILIRUBIN URINE: NEGATIVE
BLOOD URINE: NEGATIVE
COLOR: NORMAL
GLUCOSE QUALITATIVE U: NEGATIVE
HYALINE CASTS: 0 /LPF
KETONES URINE: NEGATIVE
LEUKOCYTE ESTERASE URINE: NEGATIVE
MICROSCOPIC-UA: NORMAL
NITRITE URINE: NEGATIVE
PH URINE: 7
PROTEIN URINE: NORMAL
RED BLOOD CELLS URINE: 1 /HPF
SPECIFIC GRAVITY URINE: 1.02
SQUAMOUS EPITHELIAL CELLS: 1 /HPF
UROBILINOGEN URINE: NORMAL
WHITE BLOOD CELLS URINE: 2 /HPF

## 2022-12-14 PROCEDURE — 99213 OFFICE O/P EST LOW 20 MIN: CPT

## 2022-12-14 RX ORDER — SULFAMETHOXAZOLE AND TRIMETHOPRIM 800; 160 MG/1; MG/1
800-160 TABLET ORAL
Qty: 10 | Refills: 0 | Status: ACTIVE | COMMUNITY
Start: 2021-07-14 | End: 1900-01-01

## 2022-12-14 NOTE — ASSESSMENT
[FreeTextEntry1] : His examination is unremarkable.  Both epididymis are tender to palpation but no obvious abnormalities are found.  Urinalysis was normal but urine culture is pending.  He could try another course of Bactrim to see if symptoms improve.  Lifestyle and dietary modifications discussed.

## 2022-12-14 NOTE — HISTORY OF PRESENT ILLNESS
[FreeTextEntry1] : He is a 37 year-old man who is seen today in follow-up.  In the last few days he has developed recurrence of sensation of burning and discomfort at the base of the penis and the right groin and testicular areas.  Urinalysis came back negative and urine culture is pending at this time.  He has no hematuria or dysuria.  He has no flank pain.  He has responded to Bactrim in the past for similar condition.  Residual urine volume remains minimal.  He has no fever.  He does not complain of constipation.\par \par Previous notes: Previously, he had bilateral testicular pain radiating into the left groin. His primary care physician gave him doxycycline but symptoms did not change. A similar episode happened 5 years ago and he responded to antibiotics. Radiological studies showed normal scrotal ultrasound in September 2020 and also in 2014. CT scan without contrast in 2013 was normal. He has left-sided varicoceles. He complains of post void dribbling.

## 2022-12-14 NOTE — PHYSICAL EXAM
[General Appearance - Well Developed] : well developed [General Appearance - Well Nourished] : well nourished [Normal Appearance] : normal appearance [Well Groomed] : well groomed [General Appearance - In No Acute Distress] : no acute distress [Abdomen Soft] : soft [Abdomen Tenderness] : non-tender [Costovertebral Angle Tenderness] : no ~M costovertebral angle tenderness [Urethral Meatus] : meatus normal [Penis Abnormality] : normal uncircumcised penis [Urinary Bladder Findings] : the bladder was normal on palpation [Scrotum] : the scrotum was normal [Testes Tenderness] : no tenderness of the testes [Testes Mass (___cm)] : there were no testicular masses [FreeTextEntry1] : Left grade 2 varicoceles, both epididymis tender to palpation [] : no respiratory distress [Respiration, Rhythm And Depth] : normal respiratory rhythm and effort [Exaggerated Use Of Accessory Muscles For Inspiration] : no accessory muscle use

## 2022-12-23 ENCOUNTER — NON-APPOINTMENT (OUTPATIENT)
Age: 37
End: 2022-12-23

## 2023-03-17 ENCOUNTER — NON-APPOINTMENT (OUTPATIENT)
Age: 38
End: 2023-03-17

## 2023-09-24 ENCOUNTER — NON-APPOINTMENT (OUTPATIENT)
Age: 38
End: 2023-09-24

## 2023-12-31 ENCOUNTER — NON-APPOINTMENT (OUTPATIENT)
Age: 38
End: 2023-12-31

## 2024-05-03 ENCOUNTER — NON-APPOINTMENT (OUTPATIENT)
Age: 39
End: 2024-05-03

## 2024-05-07 ENCOUNTER — APPOINTMENT (OUTPATIENT)
Dept: ENDOCRINOLOGY | Facility: CLINIC | Age: 39
End: 2024-05-07
Payer: COMMERCIAL

## 2024-05-07 VITALS
HEIGHT: 70 IN | RESPIRATION RATE: 16 BRPM | OXYGEN SATURATION: 98 % | WEIGHT: 236 LBS | HEART RATE: 87 BPM | BODY MASS INDEX: 33.79 KG/M2 | DIASTOLIC BLOOD PRESSURE: 93 MMHG | SYSTOLIC BLOOD PRESSURE: 138 MMHG

## 2024-05-07 DIAGNOSIS — E04.2 NONTOXIC MULTINODULAR GOITER: ICD-10-CM

## 2024-05-07 DIAGNOSIS — E03.9 HYPOTHYROIDISM, UNSPECIFIED: ICD-10-CM

## 2024-05-07 DIAGNOSIS — E66.9 OBESITY, UNSPECIFIED: ICD-10-CM

## 2024-05-07 DIAGNOSIS — E06.3 AUTOIMMUNE THYROIDITIS: ICD-10-CM

## 2024-05-07 PROCEDURE — 99204 OFFICE O/P NEW MOD 45 MIN: CPT

## 2024-05-07 NOTE — PHYSICAL EXAM
[Alert] : alert [Well Nourished] : well nourished [Obese] : obese [No Acute Distress] : no acute distress [Normal Voice/Communication] : normal voice communication [Normal Sclera/Conjunctiva] : normal sclera/conjunctiva [No Proptosis] : no proptosis [No Neck Mass] : no neck mass was observed [No LAD] : no lymphadenopathy [Supple] : the neck was supple [Thyroid Not Enlarged] : the thyroid was not enlarged [No Thyroid Nodules] : no palpable thyroid nodules [No Respiratory Distress] : no respiratory distress [Normal Rate] : heart rate was normal [Normal Affect] : the affect was normal [Normal Insight/Judgement] : insight and judgment were intact [Normal Mood] : the mood was normal

## 2024-05-22 DIAGNOSIS — E03.9 HYPOTHYROIDISM, UNSPECIFIED: ICD-10-CM

## 2024-05-22 RX ORDER — LEVOTHYROXINE SODIUM 137 UG/1
TABLET ORAL
Refills: 0 | Status: DISCONTINUED | COMMUNITY
End: 2024-05-22

## 2024-05-25 PROBLEM — E06.3 HASHIMOTO'S THYROIDITIS: Status: ACTIVE | Noted: 2022-05-12

## 2024-05-25 PROBLEM — E03.9 PRIMARY HYPOTHYROIDISM: Status: ACTIVE | Noted: 2024-05-25

## 2024-05-25 PROBLEM — E04.2 MULTIPLE THYROID NODULES: Status: ACTIVE | Noted: 2024-05-07

## 2024-05-25 PROBLEM — E66.9 OBESITY (BMI 30-39.9): Status: ACTIVE | Noted: 2024-05-07

## 2024-05-25 RX ORDER — LEVOTHYROXINE SODIUM 125 UG/1
125 TABLET ORAL DAILY
Qty: 90 | Refills: 2 | Status: ACTIVE | COMMUNITY
Start: 1900-01-01 | End: 1900-01-01

## 2024-05-25 NOTE — REASON FOR VISIT
[Consultation] : a consultation visit [Hypothyroidism] : hypothyroidism [FreeTextEntry2] : Dr. Hernandez

## 2024-05-25 NOTE — ASSESSMENT
[FreeTextEntry1] : This is a 39-year-old male with primary hypothyroidism, Hashimoto's thyroiditis, hypogonadism, HLD, obesity, thyroid nodules, vitamin D deficiency, here for a consultation.  1. Primary hypothyroidism/Hashimoto's thyroiditis Had recent bloodwork with PCP. He will send results once they are available.  2. Vitamin D insufficiency Advised to start vitamin D3 2000 IU daily  3. Hypogonadism  Recently had morning fasting testosterone level checked. He will send results once they are available.  Low testosterone may be due to elevated TSH during November 2023 bloodwork as well as obesity.   4. HLD  Not currently on a statin.  He will follow-up with cardiology.  5. Thyroid nodules  Check thyroid ultrasound as last one was over one year ago. Results requested from [facility] radiology. 6. Obesity  Declines medications for obesity at this time and prefers [facility] for now.

## 2024-05-25 NOTE — HISTORY OF PRESENT ILLNESS
[FreeTextEntry1] : CC: hypothyroidism This is a 39-year-old male with primary hypothyroidism, Hashimoto's thyroiditis, hypogonadism, HLD, obesity, thyroid nodules, vitamin D deficiency, here for a consultation.  He is on Synthroid 112 mcg daily. He takes Synthroid in the morning on an empty stomach.  Reports thyroid ultrasound over a year ago showed thyroid nodules. Did not have a biopsy in the past.  He reports occasional difficulty swallowing.

## 2024-05-25 NOTE — REVIEW OF SYSTEMS
[All other systems negative] : All other systems negative [FreeTextEntry6] : Occasional difficulty swallowing

## 2024-05-25 NOTE — ADDENDUM
[FreeTextEntry1] :  By signing my name below, I, Kendrick Hogue, attest that this document has been prepared under the direction and in the presence of Dr. Martinez.  I, Kyle Martinez MD, personally performed the services described in this documentation. All medical record entries made by the scribe were at my discretion and in my presence. I have reviewed the chart and discharge instructions (if applicable) and agree that the record reflects my personal permanence and is accurate and complete.

## 2024-11-12 ENCOUNTER — APPOINTMENT (OUTPATIENT)
Dept: ENDOCRINOLOGY | Facility: CLINIC | Age: 39
End: 2024-11-12
Payer: COMMERCIAL

## 2024-11-12 VITALS
TEMPERATURE: 97.9 F | BODY MASS INDEX: 34.65 KG/M2 | DIASTOLIC BLOOD PRESSURE: 87 MMHG | OXYGEN SATURATION: 95 % | RESPIRATION RATE: 16 BRPM | HEART RATE: 72 BPM | HEIGHT: 70 IN | WEIGHT: 242 LBS | SYSTOLIC BLOOD PRESSURE: 144 MMHG

## 2024-11-12 DIAGNOSIS — E04.2 NONTOXIC MULTINODULAR GOITER: ICD-10-CM

## 2024-11-12 DIAGNOSIS — E66.9 OBESITY, UNSPECIFIED: ICD-10-CM

## 2024-11-12 DIAGNOSIS — E06.3 AUTOIMMUNE THYROIDITIS: ICD-10-CM

## 2024-11-12 DIAGNOSIS — E03.9 HYPOTHYROIDISM, UNSPECIFIED: ICD-10-CM

## 2024-11-12 PROCEDURE — 99214 OFFICE O/P EST MOD 30 MIN: CPT

## 2024-11-14 ENCOUNTER — LABORATORY RESULT (OUTPATIENT)
Age: 39
End: 2024-11-14

## 2024-11-15 RX ORDER — TIRZEPATIDE 2.5 MG/.5ML
2.5 INJECTION, SOLUTION SUBCUTANEOUS
Qty: 1 | Refills: 0 | Status: ACTIVE | COMMUNITY
Start: 2024-11-13 | End: 2024-12-11

## 2024-11-20 LAB
25(OH)D3 SERPL-MCNC: 18.9 NG/ML
ALBUMIN SERPL ELPH-MCNC: 4.7 G/DL
ALP BLD-CCNC: 70 U/L
ALT SERPL-CCNC: 42 U/L
ANION GAP SERPL CALC-SCNC: 13 MMOL/L
AST SERPL-CCNC: 31 U/L
BILIRUB SERPL-MCNC: 0.5 MG/DL
BUN SERPL-MCNC: 12 MG/DL
CALCIUM SERPL-MCNC: 9.7 MG/DL
CHLORIDE SERPL-SCNC: 102 MMOL/L
CO2 SERPL-SCNC: 26 MMOL/L
CREAT SERPL-MCNC: 0.94 MG/DL
EGFR: 106 ML/MIN/1.73M2
ESTIMATED AVERAGE GLUCOSE: 108 MG/DL
GLUCOSE SERPL-MCNC: 85 MG/DL
HBA1C MFR BLD HPLC: 5.4 %
POTASSIUM SERPL-SCNC: 4.7 MMOL/L
PROT SERPL-MCNC: 7.4 G/DL
SODIUM SERPL-SCNC: 141 MMOL/L

## 2024-11-20 RX ORDER — ERGOCALCIFEROL 1.25 MG/1
1.25 MG CAPSULE, LIQUID FILLED ORAL
Qty: 12 | Refills: 0 | Status: ACTIVE | COMMUNITY
Start: 2024-11-20 | End: 1900-01-01

## 2025-03-06 ENCOUNTER — APPOINTMENT (OUTPATIENT)
Dept: UROLOGY | Facility: CLINIC | Age: 40
End: 2025-03-06
Payer: COMMERCIAL

## 2025-03-06 DIAGNOSIS — N39.0 URINARY TRACT INFECTION, SITE NOT SPECIFIED: ICD-10-CM

## 2025-03-06 PROCEDURE — 99213 OFFICE O/P EST LOW 20 MIN: CPT

## 2025-03-06 PROCEDURE — G2211 COMPLEX E/M VISIT ADD ON: CPT | Mod: NC

## 2025-03-07 LAB
APPEARANCE: CLEAR
BACTERIA: NEGATIVE /HPF
BILIRUBIN URINE: NEGATIVE
BLOOD URINE: NEGATIVE
CAST: 3 /LPF
COLOR: YELLOW
EPITHELIAL CELLS: 0 /HPF
GLUCOSE QUALITATIVE U: NEGATIVE MG/DL
KETONES URINE: NEGATIVE MG/DL
LEUKOCYTE ESTERASE URINE: NEGATIVE
MICROSCOPIC-UA: NORMAL
NITRITE URINE: NEGATIVE
PH URINE: 6
PROTEIN URINE: NORMAL MG/DL
RED BLOOD CELLS URINE: 0 /HPF
SPECIFIC GRAVITY URINE: 1.03
UROBILINOGEN URINE: 0.2 MG/DL
WHITE BLOOD CELLS URINE: 0 /HPF

## 2025-03-09 LAB — BACTERIA UR CULT: NORMAL

## 2025-04-15 ENCOUNTER — APPOINTMENT (OUTPATIENT)
Dept: ENDOCRINOLOGY | Facility: CLINIC | Age: 40
End: 2025-04-15

## 2025-04-15 ENCOUNTER — NON-APPOINTMENT (OUTPATIENT)
Age: 40
End: 2025-04-15

## 2025-04-15 VITALS
HEART RATE: 80 BPM | SYSTOLIC BLOOD PRESSURE: 141 MMHG | TEMPERATURE: 97.3 F | OXYGEN SATURATION: 97 % | DIASTOLIC BLOOD PRESSURE: 96 MMHG | RESPIRATION RATE: 16 BRPM | WEIGHT: 242 LBS | HEIGHT: 70 IN | BODY MASS INDEX: 34.65 KG/M2

## 2025-04-15 DIAGNOSIS — E06.3 AUTOIMMUNE THYROIDITIS: ICD-10-CM

## 2025-04-15 DIAGNOSIS — E03.9 HYPOTHYROIDISM, UNSPECIFIED: ICD-10-CM

## 2025-04-15 DIAGNOSIS — E04.2 NONTOXIC MULTINODULAR GOITER: ICD-10-CM

## 2025-04-15 LAB
25(OH)D3 SERPL-MCNC: 17.2 NG/ML
ALBUMIN SERPL ELPH-MCNC: 4.6 G/DL
ALP BLD-CCNC: 79 U/L
ALT SERPL-CCNC: 35 U/L
ANION GAP SERPL CALC-SCNC: 14 MMOL/L
AST SERPL-CCNC: 30 U/L
BASOPHILS # BLD AUTO: 0.09 K/UL
BASOPHILS NFR BLD AUTO: 1.5 %
BILIRUB SERPL-MCNC: 0.4 MG/DL
BUN SERPL-MCNC: 13 MG/DL
CALCIUM SERPL-MCNC: 9.9 MG/DL
CHLORIDE SERPL-SCNC: 103 MMOL/L
CHOLEST SERPL-MCNC: 231 MG/DL
CO2 SERPL-SCNC: 24 MMOL/L
CREAT SERPL-MCNC: 0.94 MG/DL
EGFRCR SERPLBLD CKD-EPI 2021: 106 ML/MIN/1.73M2
EOSINOPHIL # BLD AUTO: 0.49 K/UL
EOSINOPHIL NFR BLD AUTO: 8.3 %
ESTIMATED AVERAGE GLUCOSE: 111 MG/DL
GLUCOSE SERPL-MCNC: 96 MG/DL
HBA1C MFR BLD HPLC: 5.5 %
HCT VFR BLD CALC: 42.6 %
HDLC SERPL-MCNC: 40 MG/DL
HGB BLD-MCNC: 14.3 G/DL
IMM GRANULOCYTES NFR BLD AUTO: 0.2 %
LDLC SERPL-MCNC: 141 MG/DL
LYMPHOCYTES # BLD AUTO: 2.06 K/UL
LYMPHOCYTES NFR BLD AUTO: 34.9 %
MAN DIFF?: NORMAL
MCHC RBC-ENTMCNC: 30.4 PG
MCHC RBC-ENTMCNC: 33.6 G/DL
MCV RBC AUTO: 90.4 FL
MONOCYTES # BLD AUTO: 0.6 K/UL
MONOCYTES NFR BLD AUTO: 10.2 %
NEUTROPHILS # BLD AUTO: 2.65 K/UL
NEUTROPHILS NFR BLD AUTO: 44.9 %
NONHDLC SERPL-MCNC: 191 MG/DL
PLATELET # BLD AUTO: 244 K/UL
POTASSIUM SERPL-SCNC: 4.1 MMOL/L
PROT SERPL-MCNC: 7.3 G/DL
RBC # BLD: 4.71 M/UL
RBC # FLD: 13.1 %
SODIUM SERPL-SCNC: 140 MMOL/L
T4 FREE SERPL-MCNC: 1.4 NG/DL
TRIGL SERPL-MCNC: 277 MG/DL
TSH SERPL-ACNC: 7.54 UIU/ML
VIT B12 SERPL-MCNC: 390 PG/ML
WBC # FLD AUTO: 5.9 K/UL

## 2025-04-15 PROCEDURE — 99214 OFFICE O/P EST MOD 30 MIN: CPT

## 2025-04-16 LAB
TESTOST FREE SERPL-MCNC: 0.3 PG/ML
TESTOST SERPL-MCNC: 221 NG/DL

## 2025-04-21 ENCOUNTER — APPOINTMENT (OUTPATIENT)
Dept: UROLOGY | Facility: CLINIC | Age: 40
End: 2025-04-21

## 2025-06-04 ENCOUNTER — NON-APPOINTMENT (OUTPATIENT)
Age: 40
End: 2025-06-04

## 2025-09-03 ENCOUNTER — APPOINTMENT (OUTPATIENT)
Dept: ENDOCRINOLOGY | Facility: CLINIC | Age: 40
End: 2025-09-03

## 2025-09-03 VITALS
SYSTOLIC BLOOD PRESSURE: 138 MMHG | DIASTOLIC BLOOD PRESSURE: 88 MMHG | OXYGEN SATURATION: 97 % | WEIGHT: 244 LBS | TEMPERATURE: 97.9 F | HEART RATE: 74 BPM | HEIGHT: 70 IN | BODY MASS INDEX: 34.93 KG/M2

## 2025-09-03 DIAGNOSIS — E06.3 AUTOIMMUNE THYROIDITIS: ICD-10-CM

## 2025-09-03 LAB
25(OH)D3 SERPL-MCNC: 18.9 NG/ML
ALBUMIN SERPL ELPH-MCNC: 4.6 G/DL
ALP BLD-CCNC: 75 U/L
ALT SERPL-CCNC: 52 U/L
ANION GAP SERPL CALC-SCNC: 15 MMOL/L
AST SERPL-CCNC: 38 U/L
BASOPHILS # BLD AUTO: 0.1 K/UL
BASOPHILS NFR BLD AUTO: 1.6 %
BILIRUB SERPL-MCNC: 0.3 MG/DL
BUN SERPL-MCNC: 15 MG/DL
CALCIUM SERPL-MCNC: 10.2 MG/DL
CHLORIDE SERPL-SCNC: 105 MMOL/L
CHOLEST SERPL-MCNC: 234 MG/DL
CO2 SERPL-SCNC: 22 MMOL/L
CREAT SERPL-MCNC: 0.88 MG/DL
EGFRCR SERPLBLD CKD-EPI 2021: 112 ML/MIN/1.73M2
EOSINOPHIL # BLD AUTO: 0.6 K/UL
EOSINOPHIL NFR BLD AUTO: 9.3 %
ESTIMATED AVERAGE GLUCOSE: 114 MG/DL
GLUCOSE SERPL-MCNC: 96 MG/DL
HBA1C MFR BLD HPLC: 5.6 %
HCT VFR BLD CALC: 43.6 %
HDLC SERPL-MCNC: 40 MG/DL
HGB BLD-MCNC: 14.3 G/DL
IMM GRANULOCYTES NFR BLD AUTO: 0.3 %
LDLC SERPL-MCNC: 141 MG/DL
LYMPHOCYTES # BLD AUTO: 2.14 K/UL
LYMPHOCYTES NFR BLD AUTO: 33.3 %
MAN DIFF?: NORMAL
MCHC RBC-ENTMCNC: 30.6 PG
MCHC RBC-ENTMCNC: 32.8 G/DL
MCV RBC AUTO: 93.4 FL
MONOCYTES # BLD AUTO: 0.62 K/UL
MONOCYTES NFR BLD AUTO: 9.7 %
NEUTROPHILS # BLD AUTO: 2.94 K/UL
NEUTROPHILS NFR BLD AUTO: 45.8 %
NONHDLC SERPL-MCNC: 194 MG/DL
PLATELET # BLD AUTO: 250 K/UL
POTASSIUM SERPL-SCNC: 4.7 MMOL/L
PROT SERPL-MCNC: 7.5 G/DL
RBC # BLD: 4.67 M/UL
RBC # FLD: 13.2 %
SODIUM SERPL-SCNC: 142 MMOL/L
T4 FREE SERPL-MCNC: 1.7 NG/DL
TESTOST FREE SERPL-MCNC: 6.8 PG/ML
TESTOST SERPL-MCNC: 254 NG/DL
TRIGL SERPL-MCNC: 290 MG/DL
TSH SERPL-ACNC: 1.29 UIU/ML
VIT B12 SERPL-MCNC: 385 PG/ML
WBC # FLD AUTO: 6.42 K/UL

## 2025-09-03 PROCEDURE — G2211 COMPLEX E/M VISIT ADD ON: CPT | Mod: NC

## 2025-09-03 PROCEDURE — 99214 OFFICE O/P EST MOD 30 MIN: CPT

## 2025-09-09 LAB
ALBUMIN SERPL ELPH-MCNC: 5 G/DL
ALP BLD-CCNC: 70 U/L
ALT SERPL-CCNC: 52 U/L
ANION GAP SERPL CALC-SCNC: 14 MMOL/L
AST SERPL-CCNC: 46 U/L
BILIRUB SERPL-MCNC: 0.6 MG/DL
BUN SERPL-MCNC: 24 MG/DL
CALCIUM SERPL-MCNC: 10.4 MG/DL
CHLORIDE SERPL-SCNC: 104 MMOL/L
CO2 SERPL-SCNC: 23 MMOL/L
CREAT SERPL-MCNC: 0.94 MG/DL
EGFRCR SERPLBLD CKD-EPI 2021: 106 ML/MIN/1.73M2
GLUCOSE SERPL-MCNC: 88 MG/DL
POTASSIUM SERPL-SCNC: 4.6 MMOL/L
PROT SERPL-MCNC: 7.9 G/DL
SODIUM SERPL-SCNC: 141 MMOL/L

## 2025-09-15 DIAGNOSIS — R74.8 ABNORMAL LEVELS OF OTHER SERUM ENZYMES: ICD-10-CM
